# Patient Record
Sex: FEMALE | Race: WHITE | NOT HISPANIC OR LATINO | URBAN - METROPOLITAN AREA
[De-identification: names, ages, dates, MRNs, and addresses within clinical notes are randomized per-mention and may not be internally consistent; named-entity substitution may affect disease eponyms.]

---

## 2018-06-25 ENCOUNTER — EMERGENCY (EMERGENCY)
Facility: HOSPITAL | Age: 83
LOS: 0 days | Discharge: HOME | End: 2018-06-26
Attending: EMERGENCY MEDICINE | Admitting: EMERGENCY MEDICINE

## 2018-06-25 VITALS
HEART RATE: 79 BPM | RESPIRATION RATE: 18 BRPM | OXYGEN SATURATION: 98 % | SYSTOLIC BLOOD PRESSURE: 223 MMHG | DIASTOLIC BLOOD PRESSURE: 91 MMHG | TEMPERATURE: 97 F

## 2018-06-25 DIAGNOSIS — I10 ESSENTIAL (PRIMARY) HYPERTENSION: ICD-10-CM
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
## 2018-06-25 DIAGNOSIS — M25.521 PAIN IN RIGHT ELBOW: ICD-10-CM
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
## 2018-06-25 DIAGNOSIS — S52.021A DISPLACED FRACTURE OF OLECRANON PROCESS WITHOUT INTRAARTICULAR EXTENSION OF RIGHT ULNA, INITIAL ENCOUNTER FOR CLOSED FRACTURE: ICD-10-CM
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
## 2018-06-25 DIAGNOSIS — E11.9 TYPE 2 DIABETES MELLITUS WITHOUT COMPLICATIONS: ICD-10-CM

## 2018-06-25 DIAGNOSIS — M25.551 PAIN IN RIGHT HIP: ICD-10-CM
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
## 2018-06-25 NOTE — ED ADULT TRIAGE NOTE - CHIEF COMPLAINT QUOTE
pt slipped and fell from standing, c/o right arm and right hip pain. no LOC. not on anticoagulations.

## 2018-06-26 RX ORDER — ACETAMINOPHEN 500 MG
975 TABLET ORAL ONCE
Qty: 0 | Refills: 0 | Status: DISCONTINUED | OUTPATIENT
Start: 2018-06-26 | End: 2018-06-26

## 2018-06-26 NOTE — ED PROVIDER NOTE - PROGRESS NOTE DETAILS
Spoke to Dr. Reyes from orthopedics, advised to splint patient for right olecranon fracture and follow up with Dr. Crawford as outpatient. I received a call from radiologist who stated there may be an acute vs subacute left pubic body fracture; I called and left a message on patient's daughter's cell phone; patient was bearing weight on b/l LE; ambulating well in ED without assistance, not complaining of midline pelvic pain, patient was only complaining of right hip pain.

## 2018-06-26 NOTE — ED PROVIDER NOTE - MEDICAL DECISION MAKING DETAILS
I have personally performed a history and physical exam on this patient and personally directed the management of the patient. Patient presents for evaluation of right sided elbow pan that is moderate and throbbing in nature after she sustained a mechanical fall after slipping on a wet floor. She denies any loc and denies any vomiting.  The patient notes that she has a swollen painful right elbow with bruising noted she has decreased rom, radial pulses 2 +=, cap refill normal.  we obtained xrays which reveals olecranon fracture because of the injury we consulted ortho who reccomends outpatient management at this time splint applied I will discharge with follow up to dr abel in am.

## 2018-06-26 NOTE — ED PROVIDER NOTE - MUSCULOSKELETAL MINIMAL EXAM
right- forearm has swelling and bruising noted decreased rom, radial pulses 2 += right elbow hematoma with decreased ROM at elbow joint and tenderness to palpation, radial pulse 2+, sensation intact; no snuffbox tenderness, ROM intact at right wrist

## 2018-06-26 NOTE — ED PROVIDER NOTE - OBJECTIVE STATEMENT
85 y/o female with pmhx of HTN, DM presents s/p mechanical trip and fall. Patient states she was walking on a wet floor, slipped and fell on to her right side. Denies head trauma, LOC, blood thinners, headache, n/v. Admits to right elbow and hip pain.

## 2019-12-19 ENCOUNTER — INPATIENT (INPATIENT)
Facility: HOSPITAL | Age: 84
LOS: 1 days | Discharge: HOSPICE | End: 2019-12-21
Attending: INTERNAL MEDICINE | Admitting: INTERNAL MEDICINE
Payer: MEDICARE

## 2019-12-19 VITALS
RESPIRATION RATE: 16 BRPM | HEART RATE: 88 BPM | DIASTOLIC BLOOD PRESSURE: 60 MMHG | OXYGEN SATURATION: 100 % | SYSTOLIC BLOOD PRESSURE: 134 MMHG | TEMPERATURE: 98 F

## 2019-12-19 DIAGNOSIS — Z90.49 ACQUIRED ABSENCE OF OTHER SPECIFIED PARTS OF DIGESTIVE TRACT: Chronic | ICD-10-CM

## 2019-12-19 LAB
ALBUMIN SERPL ELPH-MCNC: 3.4 G/DL — LOW (ref 3.5–5.2)
ALP SERPL-CCNC: 62 U/L — SIGNIFICANT CHANGE UP (ref 30–115)
ALT FLD-CCNC: <5 U/L — SIGNIFICANT CHANGE UP (ref 0–41)
ANION GAP SERPL CALC-SCNC: 16 MMOL/L — HIGH (ref 7–14)
APPEARANCE UR: CLEAR — SIGNIFICANT CHANGE UP
AST SERPL-CCNC: 16 U/L — SIGNIFICANT CHANGE UP (ref 0–41)
BACTERIA # UR AUTO: NEGATIVE — SIGNIFICANT CHANGE UP
BASOPHILS # BLD AUTO: 0.03 K/UL — SIGNIFICANT CHANGE UP (ref 0–0.2)
BASOPHILS NFR BLD AUTO: 0.4 % — SIGNIFICANT CHANGE UP (ref 0–1)
BILIRUB SERPL-MCNC: 0.4 MG/DL — SIGNIFICANT CHANGE UP (ref 0.2–1.2)
BILIRUB UR-MCNC: NEGATIVE — SIGNIFICANT CHANGE UP
BUN SERPL-MCNC: 56 MG/DL — HIGH (ref 10–20)
CALCIUM SERPL-MCNC: 8.9 MG/DL — SIGNIFICANT CHANGE UP (ref 8.5–10.1)
CHLORIDE SERPL-SCNC: 104 MMOL/L — SIGNIFICANT CHANGE UP (ref 98–110)
CO2 SERPL-SCNC: 21 MMOL/L — SIGNIFICANT CHANGE UP (ref 17–32)
COLOR SPEC: YELLOW — SIGNIFICANT CHANGE UP
CREAT SERPL-MCNC: 3 MG/DL — HIGH (ref 0.7–1.5)
DIFF PNL FLD: NEGATIVE — SIGNIFICANT CHANGE UP
EOSINOPHIL # BLD AUTO: 0.08 K/UL — SIGNIFICANT CHANGE UP (ref 0–0.7)
EOSINOPHIL NFR BLD AUTO: 1.1 % — SIGNIFICANT CHANGE UP (ref 0–8)
EPI CELLS # UR: 3 /HPF — SIGNIFICANT CHANGE UP (ref 0–5)
GLUCOSE BLDC GLUCOMTR-MCNC: 92 MG/DL — SIGNIFICANT CHANGE UP (ref 70–99)
GLUCOSE SERPL-MCNC: 128 MG/DL — HIGH (ref 70–99)
GLUCOSE UR QL: NEGATIVE — SIGNIFICANT CHANGE UP
HCT VFR BLD CALC: 29.1 % — LOW (ref 37–47)
HGB BLD-MCNC: 9.2 G/DL — LOW (ref 12–16)
HYALINE CASTS # UR AUTO: 8 /LPF — HIGH (ref 0–7)
IMM GRANULOCYTES NFR BLD AUTO: 0.8 % — HIGH (ref 0.1–0.3)
KETONES UR-MCNC: NEGATIVE — SIGNIFICANT CHANGE UP
LACTATE SERPL-SCNC: 1.1 MMOL/L — SIGNIFICANT CHANGE UP (ref 0.7–2)
LEUKOCYTE ESTERASE UR-ACNC: ABNORMAL
LIDOCAIN IGE QN: 18 U/L — SIGNIFICANT CHANGE UP (ref 7–60)
LYMPHOCYTES # BLD AUTO: 0.98 K/UL — LOW (ref 1.2–3.4)
LYMPHOCYTES # BLD AUTO: 13.6 % — LOW (ref 20.5–51.1)
MCHC RBC-ENTMCNC: 29.4 PG — SIGNIFICANT CHANGE UP (ref 27–31)
MCHC RBC-ENTMCNC: 31.6 G/DL — LOW (ref 32–37)
MCV RBC AUTO: 93 FL — SIGNIFICANT CHANGE UP (ref 81–99)
MONOCYTES # BLD AUTO: 0.61 K/UL — HIGH (ref 0.1–0.6)
MONOCYTES NFR BLD AUTO: 8.5 % — SIGNIFICANT CHANGE UP (ref 1.7–9.3)
NEUTROPHILS # BLD AUTO: 5.42 K/UL — SIGNIFICANT CHANGE UP (ref 1.4–6.5)
NEUTROPHILS NFR BLD AUTO: 75.6 % — HIGH (ref 42.2–75.2)
NITRITE UR-MCNC: NEGATIVE — SIGNIFICANT CHANGE UP
NRBC # BLD: 0 /100 WBCS — SIGNIFICANT CHANGE UP (ref 0–0)
PH UR: 5.5 — SIGNIFICANT CHANGE UP (ref 5–8)
PLATELET # BLD AUTO: 337 K/UL — SIGNIFICANT CHANGE UP (ref 130–400)
POTASSIUM SERPL-MCNC: 4.8 MMOL/L — SIGNIFICANT CHANGE UP (ref 3.5–5)
POTASSIUM SERPL-SCNC: 4.8 MMOL/L — SIGNIFICANT CHANGE UP (ref 3.5–5)
PROT SERPL-MCNC: 7.3 G/DL — SIGNIFICANT CHANGE UP (ref 6–8)
PROT UR-MCNC: ABNORMAL
RBC # BLD: 3.13 M/UL — LOW (ref 4.2–5.4)
RBC # FLD: 12.1 % — SIGNIFICANT CHANGE UP (ref 11.5–14.5)
RBC CASTS # UR COMP ASSIST: 6 /HPF — HIGH (ref 0–4)
SODIUM SERPL-SCNC: 141 MMOL/L — SIGNIFICANT CHANGE UP (ref 135–146)
SP GR SPEC: 1.02 — SIGNIFICANT CHANGE UP (ref 1.01–1.02)
UROBILINOGEN FLD QL: SIGNIFICANT CHANGE UP
WBC # BLD: 7.18 K/UL — SIGNIFICANT CHANGE UP (ref 4.8–10.8)
WBC # FLD AUTO: 7.18 K/UL — SIGNIFICANT CHANGE UP (ref 4.8–10.8)
WBC UR QL: 32 /HPF — HIGH (ref 0–5)

## 2019-12-19 PROCEDURE — 71045 X-RAY EXAM CHEST 1 VIEW: CPT | Mod: 26

## 2019-12-19 PROCEDURE — 93010 ELECTROCARDIOGRAM REPORT: CPT

## 2019-12-19 PROCEDURE — 74176 CT ABD & PELVIS W/O CONTRAST: CPT | Mod: 26

## 2019-12-19 PROCEDURE — 99285 EMERGENCY DEPT VISIT HI MDM: CPT | Mod: GC

## 2019-12-19 RX ORDER — TRAMADOL HYDROCHLORIDE 50 MG/1
25 TABLET ORAL THREE TIMES A DAY
Refills: 0 | Status: DISCONTINUED | OUTPATIENT
Start: 2019-12-19 | End: 2019-12-20

## 2019-12-19 RX ORDER — PANTOPRAZOLE SODIUM 20 MG/1
40 TABLET, DELAYED RELEASE ORAL
Refills: 0 | Status: DISCONTINUED | OUTPATIENT
Start: 2019-12-19 | End: 2019-12-21

## 2019-12-19 RX ORDER — SODIUM CHLORIDE 9 MG/ML
1000 INJECTION INTRAMUSCULAR; INTRAVENOUS; SUBCUTANEOUS ONCE
Refills: 0 | Status: COMPLETED | OUTPATIENT
Start: 2019-12-19 | End: 2019-12-19

## 2019-12-19 RX ORDER — ACETAMINOPHEN 500 MG
1 TABLET ORAL
Qty: 0 | Refills: 0 | DISCHARGE

## 2019-12-19 RX ORDER — ONDANSETRON 8 MG/1
4 TABLET, FILM COATED ORAL ONCE
Refills: 0 | Status: COMPLETED | OUTPATIENT
Start: 2019-12-19 | End: 2019-12-19

## 2019-12-19 RX ORDER — GABAPENTIN 400 MG/1
100 CAPSULE ORAL THREE TIMES A DAY
Refills: 0 | Status: DISCONTINUED | OUTPATIENT
Start: 2019-12-19 | End: 2019-12-21

## 2019-12-19 RX ORDER — MORPHINE SULFATE 50 MG/1
4 CAPSULE, EXTENDED RELEASE ORAL ONCE
Refills: 0 | Status: DISCONTINUED | OUTPATIENT
Start: 2019-12-19 | End: 2019-12-19

## 2019-12-19 RX ORDER — HEPARIN SODIUM 5000 [USP'U]/ML
5000 INJECTION INTRAVENOUS; SUBCUTANEOUS EVERY 8 HOURS
Refills: 0 | Status: DISCONTINUED | OUTPATIENT
Start: 2019-12-19 | End: 2019-12-21

## 2019-12-19 RX ORDER — SODIUM CHLORIDE 9 MG/ML
1000 INJECTION, SOLUTION INTRAVENOUS
Refills: 0 | Status: DISCONTINUED | OUTPATIENT
Start: 2019-12-19 | End: 2019-12-20

## 2019-12-19 RX ORDER — GABAPENTIN 400 MG/1
1 CAPSULE ORAL
Qty: 0 | Refills: 0 | DISCHARGE

## 2019-12-19 RX ORDER — CARVEDILOL PHOSPHATE 80 MG/1
1 CAPSULE, EXTENDED RELEASE ORAL
Qty: 0 | Refills: 0 | DISCHARGE

## 2019-12-19 RX ORDER — SITAGLIPTIN AND METFORMIN HYDROCHLORIDE 500; 50 MG/1; MG/1
1 TABLET, FILM COATED ORAL
Qty: 0 | Refills: 0 | DISCHARGE

## 2019-12-19 RX ORDER — MAGNESIUM OXIDE 400 MG ORAL TABLET 241.3 MG
1 TABLET ORAL
Qty: 0 | Refills: 0 | DISCHARGE

## 2019-12-19 RX ADMIN — ONDANSETRON 4 MILLIGRAM(S): 8 TABLET, FILM COATED ORAL at 16:55

## 2019-12-19 RX ADMIN — MORPHINE SULFATE 4 MILLIGRAM(S): 50 CAPSULE, EXTENDED RELEASE ORAL at 16:55

## 2019-12-19 RX ADMIN — MORPHINE SULFATE 4 MILLIGRAM(S): 50 CAPSULE, EXTENDED RELEASE ORAL at 18:27

## 2019-12-19 RX ADMIN — SODIUM CHLORIDE 1000 MILLILITER(S): 9 INJECTION INTRAMUSCULAR; INTRAVENOUS; SUBCUTANEOUS at 18:27

## 2019-12-19 RX ADMIN — SODIUM CHLORIDE 1000 MILLILITER(S): 9 INJECTION INTRAMUSCULAR; INTRAVENOUS; SUBCUTANEOUS at 16:55

## 2019-12-19 NOTE — H&P ADULT - ATTENDING COMMENTS
Patient seen and examined independently. Agree with resident note/ history / physical exam and plan of care with following exceptions/additions/updates. Case discussed with house-staff, nursing and patient/pt decision maker.     translation by the resident who speaks [place]. Dr Meyers    pt does not want any procedures, including paracentesis at this point. agrees with bedside ultrasound as long as there is no intervention involved ( including an needle aspiration )     #Progress Note Handoff  Pending (specify):  oncology consult. abdominal ultrasound. paracentesis if pt agrees.   Family discussion: dw pt and her daughter and her grandson at the bedside. pt refusing paracentesis and all other procedures, family is not ready for goals of care but pt only wants comfort measures. at this time she is full code.   Disposition: Home

## 2019-12-19 NOTE — H&P ADULT - NSICDXPASTMEDICALHX_GEN_ALL_CORE_FT
PAST MEDICAL HISTORY:  CAD (coronary artery disease)     CKD (chronic kidney disease)     DM (diabetes mellitus)     Dyslipidemia     GERD (gastroesophageal reflux disease)     HTN (hypertension)     Spinal stenosis

## 2019-12-19 NOTE — ED PROVIDER NOTE - PHYSICAL EXAMINATION
PHYSICAL EXAM:  GENERAL: NAD, lying in bed comfortably  HEAD:  Atraumatic, Normocephalic  EYES: EOMI, PERRLA, conjunctiva and sclera clear  ENT: Moist mucous membranes  NECK: Supple, No JVD  CHEST/LUNG: Clear to auscultation bilaterally; No rales, rhonchi, wheezing, or rubs. Unlabored respirations  HEART: Regular rate and rhythm; No murmurs, rubs, or gallops  ABDOMEN: Bowel sounds present; Distended, diffusely tender; no fluid wave appreciated.   EXTREMITIES: No clubbing, cyanosis, or edema  NERVOUS SYSTEM:  Alert & Oriented X3, speech clear. No deficits; Decreased vision and hearing at baseline.   SKIN: No rashes or lesions

## 2019-12-19 NOTE — H&P ADULT - HISTORY OF PRESENT ILLNESS
86yo F w PMH HLD, DM, HTN, Chronic ischemic heart disease, CKD, osteoarthritis, GERD, spinal stenosis presenting to the ED with abdominal pain for past 4 weeks. Patient is accompanied by daughter who is serving as . Has had diffuse abdominal pain for about 3 weeks, accompanied by decreased oral intake, decreased bowel movements, decreased urine output. Denies nausea or vomiting but does endorse worsened abdominal pain with eating. Patient went to PMD this AM and was instructed to come to the ED for imaging and further work up. Denies fever, chills, CP, SOB, palpitations. She is passing flatus.      84 y/o F with PMH of CKD, high blood pressure, DM, CAD, CHF, and appendectomy sent from PMD for imaging c/o abdominal pain x 3 weeks. Pt is accompanies by daughter who is translating and states pt’s pain is diffuse, worse with food, and associated with nausea, decreased PO intake, weight loss, and abdominal distention. Pt has had (+) flatus and small bowel movements over this time. Pt  additionally notes decreased urine output but reports she recently stopped her Lasix.   Constitutional: Well appearing. No acute distress. Non toxic. Eyes: PERRLA. Extraocular movements intact, no entrapment. Conjunctiva normal. ENT: No nasal discharge. Dry mucus membranes. Neck: Supple, non tender, full range of motion. CV: RRR no murmurs, rubs, or gallops. +S1S2. Pulm: Clear to auscultation bilaterally. Normal work of breathing. Abd: (+)diffusely tender and (+) distention, no rebound/guarding +BS. Ext: Warm and well perfused x4, moving all extremities, no edema. Psy: Cooperative, appropriate. Skin: Warm, dry, no rash. Neuro: CN2-12 grossly intact no sensory or motor deficits throughout, no drift.     Date: 19-Dec-2019 18:08.     Progress: bedside ultrasound revealed ascites. pt reexamined after morphine/zofran - she states she feels the same, as she hasn't eaten/drank anything any therefore pain not exacerbated - is comfortable at rest. pending Ct scan. Patient speaks Monegasque and poor historian and is legally blind and very hard of hearing; Her daughter did the translation as per the request from patient.    86 y/o F with PMH of CKD, HTN, DM, CAD, CHF, osteoarthritis, GERD, spinal stenosis sent from PMD abdominal pain for past 4 weeks. Gradual onset, dull aching pain, no aggravating or relieving factors; associated with  decreased oral intake and weight loss, nausea, abdominal distension for 1 month duration  She also reports decreased urine output for last 1 week.  Patient has normal bowel habit but reports decreased amount of BM which she attributes to decreased oral intake. She passes flatus+     She lives alone and was independent with her daily activities until a month back with help from family; she used to go to the  5 days a week.  She has been feeling discomfort for longer than a month but since she is always complaining; family thought she will get better with time but instead she kept on getting worse and abdomen started to distend; Then she was brought to see her PMD Dr Rey Carpenter in [place] who referred to the ED.    In the ED,   vitals stable  Bedside ultrasound ascitis  CT abdomen ascitis, with ?metastatic ovarian cancer but no bowel obstruction or transition point.

## 2019-12-19 NOTE — ED ADULT NURSE NOTE - PMH
DM (diabetes mellitus)    HTN (hypertension)    No pertinent past medical history CKD (chronic kidney disease)    DM (diabetes mellitus)    Dyslipidemia    GERD (gastroesophageal reflux disease)    HTN (hypertension)    No pertinent past medical history    Spinal stenosis

## 2019-12-19 NOTE — ED PROVIDER NOTE - NS ED ROS FT
REVIEW OF SYSTEMS:    CONSTITUTIONAL: No weakness, fevers or chills  EYES/ENT: No visual changes;  No vertigo or throat pain   NECK: No pain or stiffness  RESPIRATORY: No cough, wheezing, hemoptysis; No shortness of breath  CARDIOVASCULAR: No chest pain or palpitations  GASTROINTESTINAL: Diffuse abdominal pain with distention; decreased appetite; no nausea, vomiting, or hematemesis; No diarrhea or constipation. No melena or hematochezia. Decreased bowel movement frequency.   GENITOURINARY: No dysuria, frequency or hematuria. Decreased output.   NEUROLOGICAL: No numbness or weakness  SKIN: No itching, rashes

## 2019-12-19 NOTE — ED PROVIDER NOTE - OBJECTIVE STATEMENT
84yo F w PMH HLD, DM, HTN, Chronic ischemic heart disease, CKD, osteoarthritis, GERD, spinal stenosis 86yo F w PMH HLD, DM, HTN, Chronic ischemic heart disease, CKD, osteoarthritis, GERD, spinal stenosis presenting to the ED with abdominal pain for past 4 weeks. Patient is accompanied by daughter who is serving as . Has had diffuse abdominal pain for about 3 weeks, accompanied by decreased oral intake, decreased bowel movements, decreased urine output. Denies nausea or vomiting but does endorse worsened abdominal pain with eating. Patient went to PMD this AM and was instructed to come to the ED for imaging and further work up. Denies fever, chills, CP, SOB, palpitations. She is passing flatus.

## 2019-12-19 NOTE — ED PROVIDER NOTE - PROGRESS NOTE DETAILS
ATTENDING NOTE: I personally evaluated the patient. I reviewed the Resident’s note (as assigned above), and agree with the findings and plan except as documented in my note.   84 y/o F with PMH of CKD, high blood pressure, DM, CAD, CHF, and appendectomy sent from PMD for imaging c/o abdominal pain x 3 weeks. Pt is accompanies by daughter who is translating and states pt’s pain is diffuse, worse with food, and associated with nausea, decreased PO intake, weight loss, and abdominal distention. Pt has had (+) flatus and small bowel movements over this time. Pt  additionally notes decreased urine output but reports she recently stopped her Lasix.   Constitutional: Well appearing. No acute distress. Non toxic. Eyes: PERRLA. Extraocular movements intact, no entrapment. Conjunctiva normal. ENT: No nasal discharge. Dry mucus membranes. Neck: Supple, non tender, full range of motion. CV: RRR no murmurs, rubs, or gallops. +S1S2. Pulm: Clear to auscultation bilaterally. Normal work of breathing. Abd: (+)diffusely tender and (+) distention, no rebound/guarding +BS. Ext: Warm and well perfused x4, moving all extremities, no edema. Psy: Cooperative, appropriate. Skin: Warm, dry, no rash. Neuro: CN2-12 grossly intact no sensory or motor deficits throughout, no drift. bedside ultrasound revealed ascites. pt reexamined after morphine/zofran - she states she feels the same, as she hasn't eaten/drank anything any therefore pain not exacerbated - is comfortable at rest. pending Ct scan. GYN paged

## 2019-12-19 NOTE — ED ADULT NURSE NOTE - OBJECTIVE STATEMENT
patient states has abdomen pain sharp today, states went urination with no issues and had bowel movement today patient states has abdomen pain sharp today, states went urination with no issues and had bowel movement today. sent from PMD for imaging c/o abdominal pain x 3 weeks. Pt is accompanies by daughter who is translating and states pt’s pain is diffuse, worse with food, and associated with nausea, decreased PO intake, weight loss, and abdominal distention. Pt has had (+) flatus and small bowel movements over this time. Pt  additionally notes decreased urine output but reports she recently stopped her Lasix.

## 2019-12-19 NOTE — ED PROVIDER NOTE - CLINICAL SUMMARY MEDICAL DECISION MAKING FREE TEXT BOX
84 y/o F with PMH of CKD, high blood pressure, DM, CAD, CHF, and appendectomy sent from PMD for imaging c/o abdominal pain x 3 weeks. Pt is accompanies by daughter who is translating and states pt’s pain is diffuse, worse with food, and associated with nausea, decreased PO intake, weight loss, and abdominal distention. Pt has had (+) flatus and small bowel movements over this time. Pt  additionally notes decreased urine output but reports she recently stopped her Lasix.   Constitutional: Well appearing. No acute distress. Non toxic. Eyes: PERRLA. Extraocular movements intact, no entrapment. Conjunctiva normal. ENT: No nasal discharge. Dry mucus membranes. Neck: Supple, non tender, full range of motion. CV: RRR no murmurs, rubs, or gallops. +S1S2. Pulm: Clear to auscultation bilaterally. Normal work of breathing. Abd: (+)diffusely tender and (+) distention, no rebound/guarding +BS. Ext: Warm and well perfused x4, moving all extremities, no edema. Psy: Cooperative, appropriate. Skin: Warm, dry, no rash. Neuro: CN2-12 grossly intact no sensory or motor deficits throughout, no drift. labs ekg imaging reviewed. will admit 86 y/o F with PMH of CKD, high blood pressure, DM, CAD, CHF, and appendectomy sent from PMD for imaging c/o abdominal pain x 3 weeks. Pt is accompanies by daughter who is translating and states pt’s pain is diffuse, worse with food, and associated with nausea, decreased PO intake, weight loss, and abdominal distention. Pt has had (+) flatus and small bowel movements over this time. Pt  additionally notes decreased urine output but reports she recently stopped her Lasix. labs ekg imaging reviewed. gyn paged. dw MAR, will follow-up GYN. will admit

## 2019-12-19 NOTE — H&P ADULT - ASSESSMENT
Metastatic ovarian cancer with Ascitis:  Bowel sounds present, passing gas  will keep NPO  surgery consult  pain control  iv fluids Metastatic ovarian cancer with Ascitis:  Bowel sounds present, passing gas  no need for surgery consult  will get Gyn consult  will need hem/onc consult  dietary consult  zofran for nausea Metastatic ovarian cancer with Ascitis:  Bowel sounds present, passing gas  no need for surgery consult  will get Gyn consult  will need hem/onc consult  dietary consult  zofran for nausea    VICENTE on CKD 3  hold losartan  gentle hydration    DM:  insulin    DLD:  statin    GERD:  PPI    Spinal stenosis:  tramadol as needed    DVT ppx:  heparin    Dispo: acute Metastatic ovarian cancer with omental involvement with ascitis  Bowel sounds present, passing gas  no need for surgery consult  will get Gyn consult  will need hem/onc consult  dietary consult  zofran for nausea    B/L pleural effusion:  metastatic Vs reactive  no crackles or wheeze  No SOB  get BNP    UA positive:  denies urinary frequency  get urine culture  off abx for now    VICENTE on CKD 3  hold losartan  gentle hydration    DM:  Patient is allergic to insulin  monitor finger stick  cannot give metformin VICENTE  may need endocrine consult if persistent elevation in insulin    DLD:  statin    GERD:  PPI    Spinal stenosis:  tramadol as needed    DVT ppx:  heparin    Dispo: acute Metastatic ovarian cancer with omental involvement with ascitis  Bowel sounds present, passing gas/no bowel obstruction/no jaundice  no need for surgery consult  will get Gyn consult  will need hem/onc consult  dietary consult  zofran for nausea    B/L pleural effusion:  metastatic Vs reactive  no crackles or wheeze  No SOB  get BNP    UA positive:  denies urinary frequency  get urine culture  off abx for now    VICENTE on CKD 3  hold losartan  gentle hydration  urien studies    Anemia:  normocytic  iron studies  ?Anemia of chronic disease    DM:  Patient is allergic to insulin  monitor finger stick  cannot give metformin VICENTE  may need endocrine consult if persistent elevation in insulin    DLD:  statin    GERD:  PPI    Spinal stenosis:  tramadol as needed    DVT ppx:  heparin    Dispo: acute

## 2019-12-19 NOTE — H&P ADULT - NSHPPHYSICALEXAM_GEN_ALL_CORE
PHYSICAL EXAM:  GENERAL: NAD, well-developed  HEAD:  Atraumatic, Normocephalic  EYES: EOMI, PERRLA, conjunctiva and sclera clear  NECK: Supple, No JVD  CHEST/LUNG: Clear to auscultation bilaterally; No wheeze  HEART: Regular rate and rhythm; No murmurs, rubs, or gallops  ABDOMEN: distended++, tenderness+, BS+  EXTREMITIES:  2+ Peripheral Pulses, No clubbing, cyanosis, or edema  PSYCH: AAOx3  NEUROLOGY: non-focal  SKIN: No rashes or lesions PHYSICAL EXAM:  GENERAL: NAD, well-developed  HEAD:  Atraumatic, Normocephalic  EYES: legally blind;   NECK: Supple, No JVD  CHEST/LUNG: decreased breath sounds at bases  HEART: Regular rate and rhythm; No murmurs, rubs, or gallops  ABDOMEN: distended++, non tender, BS+  EXTREMITIES:  no edema  PSYCH: AAOx3  NEUROLOGY: non-focal  SKIN: No rashes or lesions PHYSICAL EXAM:  GENERAL: NAD, well-developed  HEAD:  Atraumatic, Normocephalic  EYES: legally blind;   NECK: Supple, No JVD  CHEST/LUNG: decreased breath sounds at bases  HEART: Regular rate and rhythm; No murmurs, rubs, or gallops  ABDOMEN: distended++, non tender, BS+  EXTREMITIES:  no edema  NEUROLOGY: non-focal

## 2019-12-19 NOTE — H&P ADULT - NSHPLABSRESULTS_GEN_ALL_CORE
9.2    7.18  )-----------( 337      ( 19 Dec 2019 16:58 )             29.1                 141  |  104  |  56<H>  ----------------------------<  128<H>  4.8   |  21  |  3.0<H>    Ca    8.9      19 Dec 2019 16:58    TPro  7.3  /  Alb  3.4<L>  /  TBili  0.4  /  DBili  x   /  AST  16  /  ALT  <5  /  AlkPhos  62      LIVER FUNCTIONS - ( 19 Dec 2019 16:58 )  Alb: 3.4 g/dL / Pro: 7.3 g/dL / ALK PHOS: 62 U/L / ALT: <5 U/L / AST: 16 U/L / GGT: x                   Urinalysis Basic - ( 19 Dec 2019 16:42 )    Color: Yellow / Appearance: Clear / S.019 / pH: x  Gluc: x / Ketone: Negative  / Bili: Negative / Urobili: <2 mg/dL   Blood: x / Protein: 30 mg/dL / Nitrite: Negative   Leuk Esterase: Moderate / RBC: 6 /HPF / WBC 32 /HPF   Sq Epi: x / Non Sq Epi: 3 /HPF / Bacteria: Negative      < from: CT Abdomen and Pelvis No Cont (19 @ 19:23) >    3.5 cm, thick-walled left adnexal cystic mass with large volume of ascites, omental caking, and lymphadenopathy. The findings are most consistent with GYN malignancy (especially ovarian carcinoma) with metastatic disease within the abdomen.      The findings were discussed with Dr. Francisca Meneses at 8:00 PM 2019, with read back.    < end of copied text > 9.2    7.18  )-----------( 337      ( 19 Dec 2019 16:58 )             29.1                 141  |  104  |  56<H>  ----------------------------<  128<H>  4.8   |  21  |  3.0<H>    Ca    8.9      19 Dec 2019 16:58    TPro  7.3  /  Alb  3.4<L>  /  TBili  0.4  /  DBili  x   /  AST  16  /  ALT  <5  /  AlkPhos  62      LIVER FUNCTIONS - ( 19 Dec 2019 16:58 )  Alb: 3.4 g/dL / Pro: 7.3 g/dL / ALK PHOS: 62 U/L / ALT: <5 U/L / AST: 16 U/L / GGT: x                   Urinalysis Basic - ( 19 Dec 2019 16:42 )    Color: Yellow / Appearance: Clear / S.019 / pH: x  Gluc: x / Ketone: Negative  / Bili: Negative / Urobili: <2 mg/dL   Blood: x / Protein: 30 mg/dL / Nitrite: Negative   Leuk Esterase: Moderate / RBC: 6 /HPF / WBC 32 /HPF   Sq Epi: x / Non Sq Epi: 3 /HPF / Bacteria: Negative      < from: CT Abdomen and Pelvis No Cont (19 @ 19:23) >    < from: CT Abdomen and Pelvis No Cont (19 @ 19:23) >    3.5 cm, thick-walled left adnexal cystic mass with large volume of ascites, omental caking, and lymphadenopathy. The findings are most consistent with GYN malignancy (especially ovarian carcinoma) with metastatic disease within the abdomen.      The findings were discussed with Dr. Francisca Meneses at 8:00 PM 2019, with read back.    < end of copied text >        3.5 cm, thick-walled left adnexal cystic mass with large volume of ascites, omental caking, and lymphadenopathy. The findings are most consistent with GYN malignancy (especially ovarian carcinoma) with metastatic disease within the abdomen.      The findings were discussed with Dr. Francisca Meneses at 8:00 PM 2019, with read back.    < end of copied text >

## 2019-12-20 LAB
ALBUMIN SERPL ELPH-MCNC: 3 G/DL — LOW (ref 3.5–5.2)
ALP SERPL-CCNC: 54 U/L — SIGNIFICANT CHANGE UP (ref 30–115)
ALT FLD-CCNC: <5 U/L — SIGNIFICANT CHANGE UP (ref 0–41)
ANION GAP SERPL CALC-SCNC: 16 MMOL/L — HIGH (ref 7–14)
AST SERPL-CCNC: 15 U/L — SIGNIFICANT CHANGE UP (ref 0–41)
BASOPHILS # BLD AUTO: 0.04 K/UL — SIGNIFICANT CHANGE UP (ref 0–0.2)
BASOPHILS NFR BLD AUTO: 0.7 % — SIGNIFICANT CHANGE UP (ref 0–1)
BILIRUB DIRECT SERPL-MCNC: <0.2 MG/DL — SIGNIFICANT CHANGE UP (ref 0–0.2)
BILIRUB INDIRECT FLD-MCNC: >0.2 MG/DL — SIGNIFICANT CHANGE UP (ref 0.2–1.2)
BILIRUB SERPL-MCNC: 0.4 MG/DL — SIGNIFICANT CHANGE UP (ref 0.2–1.2)
BUN SERPL-MCNC: 52 MG/DL — HIGH (ref 10–20)
CALCIUM SERPL-MCNC: 8.4 MG/DL — LOW (ref 8.5–10.1)
CHLORIDE SERPL-SCNC: 106 MMOL/L — SIGNIFICANT CHANGE UP (ref 98–110)
CO2 SERPL-SCNC: 21 MMOL/L — SIGNIFICANT CHANGE UP (ref 17–32)
CREAT SERPL-MCNC: 2.8 MG/DL — HIGH (ref 0.7–1.5)
CULTURE RESULTS: SIGNIFICANT CHANGE UP
EOSINOPHIL # BLD AUTO: 0.12 K/UL — SIGNIFICANT CHANGE UP (ref 0–0.7)
EOSINOPHIL NFR BLD AUTO: 2.2 % — SIGNIFICANT CHANGE UP (ref 0–8)
FERRITIN SERPL-MCNC: 205 NG/ML — HIGH (ref 15–150)
GLUCOSE BLDC GLUCOMTR-MCNC: 100 MG/DL — HIGH (ref 70–99)
GLUCOSE BLDC GLUCOMTR-MCNC: 131 MG/DL — HIGH (ref 70–99)
GLUCOSE BLDC GLUCOMTR-MCNC: 135 MG/DL — HIGH (ref 70–99)
GLUCOSE BLDC GLUCOMTR-MCNC: 140 MG/DL — HIGH (ref 70–99)
GLUCOSE SERPL-MCNC: 151 MG/DL — HIGH (ref 70–99)
HCT VFR BLD CALC: 27.3 % — LOW (ref 37–47)
HGB BLD-MCNC: 8.6 G/DL — LOW (ref 12–16)
IMM GRANULOCYTES NFR BLD AUTO: 0.7 % — HIGH (ref 0.1–0.3)
IRON SATN MFR SERPL: 13 % — LOW (ref 15–50)
IRON SATN MFR SERPL: 24 UG/DL — LOW (ref 35–150)
LYMPHOCYTES # BLD AUTO: 1.14 K/UL — LOW (ref 1.2–3.4)
LYMPHOCYTES # BLD AUTO: 21 % — SIGNIFICANT CHANGE UP (ref 20.5–51.1)
MAGNESIUM SERPL-MCNC: 1.3 MG/DL — LOW (ref 1.8–2.4)
MCHC RBC-ENTMCNC: 29.5 PG — SIGNIFICANT CHANGE UP (ref 27–31)
MCHC RBC-ENTMCNC: 31.5 G/DL — LOW (ref 32–37)
MCV RBC AUTO: 93.5 FL — SIGNIFICANT CHANGE UP (ref 81–99)
MONOCYTES # BLD AUTO: 0.55 K/UL — SIGNIFICANT CHANGE UP (ref 0.1–0.6)
MONOCYTES NFR BLD AUTO: 10.1 % — HIGH (ref 1.7–9.3)
NEUTROPHILS # BLD AUTO: 3.55 K/UL — SIGNIFICANT CHANGE UP (ref 1.4–6.5)
NEUTROPHILS NFR BLD AUTO: 65.3 % — SIGNIFICANT CHANGE UP (ref 42.2–75.2)
NRBC # BLD: 0 /100 WBCS — SIGNIFICANT CHANGE UP (ref 0–0)
NT-PROBNP SERPL-SCNC: 925 PG/ML — HIGH (ref 0–300)
PLATELET # BLD AUTO: 305 K/UL — SIGNIFICANT CHANGE UP (ref 130–400)
POTASSIUM SERPL-MCNC: 5.2 MMOL/L — HIGH (ref 3.5–5)
POTASSIUM SERPL-SCNC: 5.2 MMOL/L — HIGH (ref 3.5–5)
PROT SERPL-MCNC: 6.1 G/DL — SIGNIFICANT CHANGE UP (ref 6–8)
RBC # BLD: 2.92 M/UL — LOW (ref 4.2–5.4)
RBC # FLD: 12 % — SIGNIFICANT CHANGE UP (ref 11.5–14.5)
SODIUM SERPL-SCNC: 143 MMOL/L — SIGNIFICANT CHANGE UP (ref 135–146)
SPECIMEN SOURCE: SIGNIFICANT CHANGE UP
TIBC SERPL-MCNC: 189 UG/DL — LOW (ref 220–430)
UIBC SERPL-MCNC: 165 UG/DL — SIGNIFICANT CHANGE UP (ref 110–370)
WBC # BLD: 5.44 K/UL — SIGNIFICANT CHANGE UP (ref 4.8–10.8)
WBC # FLD AUTO: 5.44 K/UL — SIGNIFICANT CHANGE UP (ref 4.8–10.8)

## 2019-12-20 PROCEDURE — 99221 1ST HOSP IP/OBS SF/LOW 40: CPT

## 2019-12-20 PROCEDURE — 99497 ADVNCD CARE PLAN 30 MIN: CPT | Mod: 25

## 2019-12-20 PROCEDURE — 88189 FLOWCYTOMETRY/READ 16 & >: CPT

## 2019-12-20 PROCEDURE — 99223 1ST HOSP IP/OBS HIGH 75: CPT

## 2019-12-20 PROCEDURE — 99222 1ST HOSP IP/OBS MODERATE 55: CPT

## 2019-12-20 PROCEDURE — 93970 EXTREMITY STUDY: CPT | Mod: 26

## 2019-12-20 RX ORDER — SODIUM CHLORIDE 9 MG/ML
1000 INJECTION, SOLUTION INTRAVENOUS
Refills: 0 | Status: DISCONTINUED | OUTPATIENT
Start: 2019-12-20 | End: 2019-12-20

## 2019-12-20 RX ORDER — TRAMADOL HYDROCHLORIDE 50 MG/1
25 TABLET ORAL
Refills: 0 | Status: DISCONTINUED | OUTPATIENT
Start: 2019-12-20 | End: 2019-12-21

## 2019-12-20 RX ORDER — LANOLIN ALCOHOL/MO/W.PET/CERES
5 CREAM (GRAM) TOPICAL ONCE
Refills: 0 | Status: COMPLETED | OUTPATIENT
Start: 2019-12-20 | End: 2019-12-20

## 2019-12-20 RX ORDER — MAGNESIUM SULFATE 500 MG/ML
2 VIAL (ML) INJECTION ONCE
Refills: 0 | Status: COMPLETED | OUTPATIENT
Start: 2019-12-20 | End: 2019-12-20

## 2019-12-20 RX ORDER — SODIUM POLYSTYRENE SULFONATE 4.1 MEQ/G
15 POWDER, FOR SUSPENSION ORAL ONCE
Refills: 0 | Status: COMPLETED | OUTPATIENT
Start: 2019-12-20 | End: 2019-12-20

## 2019-12-20 RX ADMIN — TRAMADOL HYDROCHLORIDE 25 MILLIGRAM(S): 50 TABLET ORAL at 11:05

## 2019-12-20 RX ADMIN — SODIUM POLYSTYRENE SULFONATE 15 GRAM(S): 4.1 POWDER, FOR SUSPENSION ORAL at 11:06

## 2019-12-20 RX ADMIN — SODIUM CHLORIDE 75 MILLILITER(S): 9 INJECTION, SOLUTION INTRAVENOUS at 02:10

## 2019-12-20 RX ADMIN — Medication 50 GRAM(S): at 11:06

## 2019-12-20 RX ADMIN — TRAMADOL HYDROCHLORIDE 25 MILLIGRAM(S): 50 TABLET ORAL at 10:32

## 2019-12-20 NOTE — CHART NOTE - NSCHARTNOTEFT_GEN_A_CORE
Spoke with family at length this morning. The patient was of sound mind and refusing any procedure involving a needle, with concern for complications and worsening of her clinical situation. The patient did, however agree to an ultrasound to "look" as long as no needle was used. Subsequently, after lengthy discussion, with palliative care on the case, the patient has agreed to a therapeutic and diagnosis paracentesis in the afternoon, as long as her "liver or heart are not affected". A hospice and podiatry consult has been placed at the request of the patients family as well. Will continue to follow.

## 2019-12-20 NOTE — PROGRESS NOTE ADULT - ASSESSMENT
84 y/o F with PMH of CKD, HTN, DM, CAD, CHF, osteoarthritis, GERD, spinal stenosis sent from PMD abdominal pain for past 4 weeks.     #Metastatic ovarian cancer with omental involvement with ascitis  - Bowel sounds present, passing gas/no bowel obstruction/no jaundice  - no need for surgery consult at this time  - F/U Gyn consult  - F/U Heme/Onc Consult?  - F/U Dietary consult  - F/U Palliative care  - Zofran for nausea    #UA positive:  - denies urinary frequency  - F/U Urine Culture  - off abx for now    #VICENTE on CKD 3  - hold losartan  - gentle hydration  - Follow BMP, no baseline?    #Anemia:  - normocytic  - F/U Fe Studies   - ?Anemia of chronic disease    #DM:  - Patient is allergic to insulin  - Monitor finger stick  - Cannot give metformin d/t VICENTE  - May need endocrine consult if persistent elevation in insulin    #DLD:  - statin    #GERD:  - PPI    #Spinal stenosis:  - tramadol as needed    #DVT ppx:  - heparin    Dispo: acute 84 y/o F with PMH of CKD, HTN, DM, CAD, CHF, osteoarthritis, GERD, spinal stenosis sent from PMD abdominal pain for past 4 weeks.     #Metastatic ovarian cancer with omental involvement with ascitis  - Bowel sounds present, passing gas/no bowel obstruction/no jaundice  - no need for surgery consult at this time  - F/U Gyn consult  - F/U Heme/Onc Consult?  - F/U Dietary consult  - F/U Palliative care  - Zofran for nausea  - Will talk to family today about the possibility of a paracentesis    #UA positive:  - denies urinary frequency  - F/U Urine Culture  - off abx for now    #VICENTE on CKD 3  - hold losartan  - gentle hydration  - Follow BMP, no baseline?    #Anemia:  - normocytic  - F/U Fe Studies   - ?Anemia of chronic disease    #DM:  - Patient is allergic to insulin  - Monitor finger stick  - Cannot give metformin d/t VICENTE  - May need endocrine consult if persistent elevation in insulin    #DLD:  - statin    #GERD:  - PPI    #Spinal stenosis:  - tramadol as needed    #DVT ppx:  - heparin    Dispo: acute

## 2019-12-20 NOTE — CONSULT NOTE ADULT - SUBJECTIVE AND OBJECTIVE BOX
Patient is a 85y old  Female who presents with a chief complaint of Abdominal pain (20 Dec 2019 08:10)      HPI:  Patient speaks Palestinian and poor historian and is legally blind and very hard of hearing; Her daughter did the translation as per the request from patient.    86 y/o F with PMH of CKD, HTN, DM, CAD, CHF, osteoarthritis, GERD, spinal stenosis sent from PMD abdominal pain for past 4 weeks. Gradual onset, dull aching pain, no aggravating or relieving factors; associated with  decreased oral intake and weight loss, nausea, abdominal distension for 1 month duration  She also reports decreased urine output for last 1 week.  Patient has normal bowel habit but reports decreased amount of BM which she attributes to decreased oral intake. She passes flatus+     She lives alone and was independent with her daily activities until a month back with help from family; she used to go to the  5 days a week.  She has been feeling discomfort for longer than a month but since she is always complaining; family thought she will get better with time but instead she kept on getting worse and abdomen started to distend; Then she was brought to see her PMD Dr Rey Carpenter in [place] who referred to the ED.    In the ED,   vitals stable  Bedside ultrasound ascitis  CT abdomen ascitis, with ?metastatic ovarian cancer but no bowel obstruction or transition point. (19 Dec 2019 20:29)         PAST MEDICAL & SURGICAL HISTORY:  CAD (coronary artery disease)  Spinal stenosis  CKD (chronic kidney disease)  GERD (gastroesophageal reflux disease)  Dyslipidemia  HTN (hypertension)  DM (diabetes mellitus)  S/P appendectomy      SOCIAL HISTORY:    FAMILY HISTORY:    Allergies    contrast media (iodine-based) (Anaphylaxis)  insulin (Unknown)  procaine (Unknown)    Intolerances      ROS:  Negative except for:        Height (cm): 157.48 (19 @ 16:15)  Weight (kg): 72.6 (19 @ 16:15)  BMI (kg/m2): 29.3 (19 @ 16:15)  BSA (m2): 1.74 (19 @ 16:15)      HOME MEDICATIONS:  carvedilol 25 mg oral tablet: 1 tab(s) orally 2 times a day (19 Dec 2019 21:32)  gabapentin 100 mg oral capsule: 1 cap(s) orally 3 times a day (19 Dec 2019 21:32)  glipiZIDE 10 mg oral tablet: 1 tab(s) orally once a day (19 Dec 2019 21:32)  Hyzaar 100 mg-25 mg oral tablet: 1 tab(s) orally once a day (19 Dec 2019 21:32)  Janumet 50 mg-1000 mg oral tablet: 1 tab(s) orally 2 times a day (19 Dec 2019 21:32)  magnesium oxide 400 mg (240 mg elemental magnesium) oral tablet: 1 tab(s) orally once a day (19 Dec 2019 21:32)  tramadol-acetaminophen 37.5 mg-325 mg oral tablet: 1 tab(s) orally every 6 hours (19 Dec 2019 21:32)      Vital Signs Last 24 Hrs  T(C): 36.6 (20 Dec 2019 13:52), Max: 36.6 (20 Dec 2019 13:52)  T(F): 97.8 (20 Dec 2019 13:52), Max: 97.8 (20 Dec 2019 13:52)  HR: 77 (20 Dec 2019 13:52) (67 - 83)  BP: 138/77 (20 Dec 2019 13:52) (111/53 - 141/67)  BP(mean): --  RR: 18 (20 Dec 2019 13:52) (18 - 18)  SpO2: 93% (20 Dec 2019 08:52) (93% - 96%)    PHYSICAL EXAM  General:   HEENT:   CV:   Lungs:   Abdomen:  Ext:   Neuro:   MEDICATIONS  (STANDING):  gabapentin 100 milliGRAM(s) Oral three times a day  heparin  Injectable 5000 Unit(s) SubCutaneous every 8 hours  pantoprazole    Tablet 40 milliGRAM(s) Oral before breakfast    MEDICATIONS  (PRN):  traMADol 25 milliGRAM(s) Oral three times a day PRN Moderate Pain (4 - 6)      LABS:                          8.6    5.44  )-----------( 305      ( 20 Dec 2019 06:48 )             27.3         Mean Cell Volume : 93.5 fL  Mean Cell Hemoglobin : 29.5 pg  Mean Cell Hemoglobin Concentration : 31.5 g/dL  Auto Neutrophil # : 3.55 K/uL  Auto Lymphocyte # : 1.14 K/uL  Auto Monocyte # : 0.55 K/uL  Auto Eosinophil # : 0.12 K/uL  Auto Basophil # : 0.04 K/uL  Auto Neutrophil % : 65.3 %  Auto Lymphocyte % : 21.0 %  Auto Monocyte % : 10.1 %  Auto Eosinophil % : 2.2 %  Auto Basophil % : 0.7 %      Serial CBC's   @ 06:48  Hct-27.3 / Hgb-8.6 / Plat-305 / RBC-2.92 / WBC-5.44  Serial CBC's   @ 16:58  Hct-29.1 / Hgb-9.2 / Plat-337 / RBC-3.13 / WBC-7.18          143  |  106  |  52<H>  ----------------------------<  151<H>  5.2<H>   |  21  |  2.8<H>    Ca    8.4<L>      20 Dec 2019 06:48  Mg     1.3         TPro  6.1  /  Alb  3.0<L>  /  TBili  0.4  /  DBili  <0.2  /  AST  15  /  ALT  <5  /  AlkPhos  54            Iron - Total Binding Capacity.: 189 ug/dL ( @ 06:48)              Urinalysis Basic - ( 19 Dec 2019 16:42 )    Color: Yellow / Appearance: Clear / S.019 / pH: x  Gluc: x / Ketone: Negative  / Bili: Negative / Urobili: <2 mg/dL   Blood: x / Protein: 30 mg/dL / Nitrite: Negative   Leuk Esterase: Moderate / RBC: 6 /HPF / WBC 32 /HPF   Sq Epi: x / Non Sq Epi: 3 /HPF / Bacteria: Negative                BLOOD SMEAR INTERPRETATION:       RADIOLOGY & ADDITIONAL STUDIES: Patient is a 85y old  Female who presents with a chief complaint of Abdominal pain (20 Dec 2019 08:10)      HPI:  Patient speaks Greek and poor historian and is legally blind and very hard of hearing; Her daughter did the translation as per the request from patient.    86 y/o F with PMH of CKD, HTN, DM, CAD, CHF, osteoarthritis, GERD, spinal stenosis sent from PMD abdominal pain for past 4 weeks. Gradual onset, dull aching pain, no aggravating or relieving factors; associated with  decreased oral intake and weight loss, nausea, abdominal distension for 1 month duration  She also reports decreased urine output for last 1 week.  Patient has normal bowel habit but reports decreased amount of BM which she attributes to decreased oral intake. She passes flatus+     She lives alone and was independent with her daily activities until a month back with help from family; she used to go to the  5 days a week.  She has been feeling discomfort for longer than a month but since she is always complaining; family thought she will get better with time but instead she kept on getting worse and abdomen started to distend; Then she was brought to see her PMD Dr Rey Carpenter in [place] who referred to the ED.    In the ED,   vitals stable  Bedside ultrasound ascitis  CT abdomen ascitis, with ?metastatic ovarian cancer but no bowel obstruction or transition point. (19 Dec 2019 20:29)         PAST MEDICAL & SURGICAL HISTORY:  CAD (coronary artery disease)  Spinal stenosis  CKD (chronic kidney disease)  GERD (gastroesophageal reflux disease)  Dyslipidemia  HTN (hypertension)  DM (diabetes mellitus)  S/P appendectomy      SOCIAL HISTORY:    FAMILY HISTORY:    Allergies    contrast media (iodine-based) (Anaphylaxis)  insulin (Unknown)  procaine (Unknown)    Intolerances      ROS:  Negative except for:        Height (cm): 157.48 (19 @ 16:15)  Weight (kg): 72.6 (19 @ 16:15)  BMI (kg/m2): 29.3 (19 @ 16:15)  BSA (m2): 1.74 (19 @ 16:15)      HOME MEDICATIONS:  carvedilol 25 mg oral tablet: 1 tab(s) orally 2 times a day (19 Dec 2019 21:32)  gabapentin 100 mg oral capsule: 1 cap(s) orally 3 times a day (19 Dec 2019 21:32)  glipiZIDE 10 mg oral tablet: 1 tab(s) orally once a day (19 Dec 2019 21:32)  Hyzaar 100 mg-25 mg oral tablet: 1 tab(s) orally once a day (19 Dec 2019 21:32)  Janumet 50 mg-1000 mg oral tablet: 1 tab(s) orally 2 times a day (19 Dec 2019 21:32)  magnesium oxide 400 mg (240 mg elemental magnesium) oral tablet: 1 tab(s) orally once a day (19 Dec 2019 21:32)  tramadol-acetaminophen 37.5 mg-325 mg oral tablet: 1 tab(s) orally every 6 hours (19 Dec 2019 21:32)      Vital Signs Last 24 Hrs  T(C): 36.6 (20 Dec 2019 13:52), Max: 36.6 (20 Dec 2019 13:52)  T(F): 97.8 (20 Dec 2019 13:52), Max: 97.8 (20 Dec 2019 13:52)  HR: 77 (20 Dec 2019 13:52) (67 - 83)  BP: 138/77 (20 Dec 2019 13:52) (111/53 - 141/67)  BP(mean): --  RR: 18 (20 Dec 2019 13:52) (18 - 18)  SpO2: 93% (20 Dec 2019 08:52) (93% - 96%)    PHYSICAL EXAM  General: lying on bed , medical team attempting to do para and pt is c/o pain, complete physical exam not performed    Abdomen: distended   Neuro: AOX 3   MEDICATIONS  (STANDING):  gabapentin 100 milliGRAM(s) Oral three times a day  heparin  Injectable 5000 Unit(s) SubCutaneous every 8 hours  pantoprazole    Tablet 40 milliGRAM(s) Oral before breakfast    MEDICATIONS  (PRN):  traMADol 25 milliGRAM(s) Oral three times a day PRN Moderate Pain (4 - 6)      LABS:                          8.6    5.44  )-----------( 305      ( 20 Dec 2019 06:48 )             27.3         Mean Cell Volume : 93.5 fL  Mean Cell Hemoglobin : 29.5 pg  Mean Cell Hemoglobin Concentration : 31.5 g/dL  Auto Neutrophil # : 3.55 K/uL  Auto Lymphocyte # : 1.14 K/uL  Auto Monocyte # : 0.55 K/uL  Auto Eosinophil # : 0.12 K/uL  Auto Basophil # : 0.04 K/uL  Auto Neutrophil % : 65.3 %  Auto Lymphocyte % : 21.0 %  Auto Monocyte % : 10.1 %  Auto Eosinophil % : 2.2 %  Auto Basophil % : 0.7 %      Serial CBC's   @ 06:48  Hct-27.3 / Hgb-8.6 / Plat-305 / RBC-2.92 / WBC-5.44  Serial CBC's   @ 16:58  Hct-29.1 / Hgb-9.2 / Plat-337 / RBC-3.13 / WBC-7.18          143  |  106  |  52<H>  ----------------------------<  151<H>  5.2<H>   |  21  |  2.8<H>    Ca    8.4<L>      20 Dec 2019 06:48  Mg     1.3         TPro  6.1  /  Alb  3.0<L>  /  TBili  0.4  /  DBili  <0.2  /  AST  15  /  ALT  <5  /  AlkPhos  54            Iron - Total Binding Capacity.: 189 ug/dL ( @ 06:48)              Urinalysis Basic - ( 19 Dec 2019 16:42 )    Color: Yellow / Appearance: Clear / S.019 / pH: x  Gluc: x / Ketone: Negative  / Bili: Negative / Urobili: <2 mg/dL   Blood: x / Protein: 30 mg/dL / Nitrite: Negative   Leuk Esterase: Moderate / RBC: 6 /HPF / WBC 32 /HPF   Sq Epi: x / Non Sq Epi: 3 /HPF / Bacteria: Negative                BLOOD SMEAR INTERPRETATION:       RADIOLOGY & ADDITIONAL STUDIES:  < from: CT Abdomen and Pelvis No Cont (19 @ 19:23) >  IMPRESSION:    3.5 cm, thick-walled left adnexal cystic mass with large volume of ascites, omental caking, and lymphadenopathy. The findings are most consistent with GYN malignancy (especially ovarian carcinoma) with metastatic disease within the abdomen.      The findings were discussed with Dr. Francisca Meneses at 8:00 PM 2019, with read back.                < end of copied text >

## 2019-12-20 NOTE — CONSULT NOTE ADULT - ASSESSMENT
86 y/o F with PMH of CKD, HTN, DM, CAD, CHF, osteoarthritis, GERD, spinal stenosis sent from PMD abdominal pain for past 4 weeks. Gradual onset, dull aching pain, no aggravating or relieving factors; associated with  decreased oral intake and weight loss, nausea, abdominal distension for 1 month duration  She also reports decreased urine output for last 1 week. 86 y/o F with PMH of CKD, HTN, DM, CAD, CHF, osteoarthritis, GERD, spinal stenosis sent from PMD abdominal pain for past 4 weeks. Gradual onset, dull aching pain, no aggravating or relieving factors; associated with  decreased oral intake and weight loss, nausea, abdominal distension for 1 month duration  She also reports decreased urine output for last 1 week.    # Left adnexal cystic mass with large volume of ascites, omental caking, and lymphadenopathy Suggestive of underlying malignancy:  - Likely GYN in origin . Had detailed discussion with pt and family , Pt herself does not want to have any interventions done. She is even refusing Paracentesis . Family on the other hand are interested in knowing the diagnosis. Pt is very frail , in order for us to offer her any treatment , we would recommend biopsy , pt does not want biopsy and further w/u . We respect her wishes .  - Medical team attempted to do paracentesis at bedside but were unscucessful , can call IR to see if therapeutic para could be done and if pt agrees.  - Small sample that was obtained from para is submitted for cytology .  - After detailed discussion family is still hesitant in making decision , also leaning towards Hospice but too in [facility] . If they still want to know diagnosis will call them with results of para.

## 2019-12-20 NOTE — CONSULT NOTE ADULT - ATTENDING COMMENTS
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    I had a long discussion with the family. The patient expressed no interest in treatment, but she did agree to a  palliative paracentesis. The resident team was attempting a paracentesis when I was there, but unfortunately only minimal fluid was obtained. She does not wish for any more procedures.    I had a long talk with the family, we  went over prognosis as well as possible complications, including SBO. They will consider hospice in the future, but do not think she is ready. They will be interested in a facility in [place] , although they all live in [place].    I suggested that they speak to her to see if  she be willing to perform another procedure in order to alleviate the pressure on Monday. By our interventional team and they will discuss this with her. It could also arrange paracentesis as outpatient. This would be in [place].    Once she is ready for hospice they can reach out to me and we can help.    At this point recommend supportive measures only thank you for the consultation discussed with residents about side.    There was minimal peritoneal fluid, but will be sent for cytology.

## 2019-12-20 NOTE — CONSULT NOTE ADULT - SUBJECTIVE AND OBJECTIVE BOX
REQUESTED OF: DR Westbrook    CLINICAL ISSUE TO BE EVALUATED BY CONSULTANT: GOals of care/pain mngmnt        MILDRED KHAN 85yFemale  HPI:  Patient speaks Syrian and poor historian and is legally blind and very hard of hearing; Her daughter did the translation as per the request from patient.    84 y/o F with PMH of CKD, HTN, DM, CAD, CHF, osteoarthritis, GERD, spinal stenosis sent from PMD abdominal pain for past 4 weeks. Gradual onset, dull aching pain, no aggravating or relieving factors; associated with  decreased oral intake and weight loss, nausea, abdominal distension for 1 month duration  She also reports decreased urine output for last 1 week.  Patient has normal bowel habit but reports decreased amount of BM which she attributes to decreased oral intake. She passes flatus+     She lives alone and was independent with her daily activities until a month back with help from family; she used to go to the  5 days a week.  She has been feeling discomfort for longer than a month but since she is always complaining; family thought she will get better with time but instead she kept on getting worse and abdomen started to distend; Then she was brought to see her PMD Dr Rey Carpenter in [place] who referred to the ED.    In the ED,   vitals stable  Bedside ultrasound ascitis  CT abdomen ascitis, with ?metastatic ovarian cancer but no bowel obstruction or transition point. (19 Dec 2019 20:29        PAST MEDICAL & SURGICAL HISTORY:  CAD (coronary artery disease)  Spinal stenosis  CKD (chronic kidney disease)  GERD (gastroesophageal reflux disease)  Dyslipidemia  HTN (hypertension)  DM (diabetes mellitus)  S/P appendectomy      Pt seen on medical floor, two daughters and grandson at bedside. Pt a & o x 3. Reports pain is well controlled, c/o increased abdominal girth making it difficult to eat and walk. No dyspnea. No bladder/bowel abnormalities        PHYSICAL EXAM:  Elderly Syrian speaking lady, a & o x 3, nad, ambulatory  PERRL  RRR  Abdom ++ ascites, soft, minimally tender diffusely, no guarding  no edema  no focal deficits          T(C): 36.6, Max: 36.6 (13:52)  HR: 77 (67 - 83)  BP: 138/77 (111/53 - 141/67)  RR: 18 (18 - 18)  SpO2: 93% (93% - 96%)      LABS/STUDIES:  12-20    143  |  106  |  52<H>  ----------------------------<  151<H>  5.2<H>   |  21  |  2.8<H>    Ca    8.4<L>      20 Dec 2019 06:48  Mg     1.3     12-20    TPro  6.1  /  Alb  3.0<L>  /  TBili  0.4  /  DBili  <0.2  /  AST  15  /  ALT  <5  /  AlkPhos  54  12-20                            8.6    5.44  )-----------( 305      ( 20 Dec 2019 06:48 )             27.3       MEDICATIONS  (STANDING):  gabapentin 100 milliGRAM(s) Oral three times a day  heparin  Injectable 5000 Unit(s) SubCutaneous every 8 hours  pantoprazole    Tablet 40 milliGRAM(s) Oral before breakfast    MEDICATIONS  (PRN):  traMADol 25 milliGRAM(s) Oral three times a day PRN Moderate Pain (4 - 6)        [place] Symptom Assesment Scale      PPS (Palliative Performance Scale): 60

## 2019-12-20 NOTE — CHART NOTE - NSCHARTNOTEFT_GEN_A_CORE
Patient likely has metastatic cancer. As per d/w patient family, they prefer she receives the news of her disease/prognosis from the family, preferably with palliative team. Palliative team consult placed.

## 2019-12-20 NOTE — PROGRESS NOTE ADULT - SUBJECTIVE AND OBJECTIVE BOX
HPI:  Patient speaks New Zealander and poor historian and is legally blind and very hard of hearing; Her daughter did the translation as per the request from patient.    86 y/o F with PMH of CKD, HTN, DM, CAD, CHF, osteoarthritis, GERD, spinal stenosis sent from PMD abdominal pain for past 4 weeks. Gradual onset, dull aching pain, no aggravating or relieving factors; associated with  decreased oral intake and weight loss, nausea, abdominal distension for 1 month duration  She also reports decreased urine output for last 1 week.  Patient has normal bowel habit but reports decreased amount of BM which she attributes to decreased oral intake. She passes flatus+     She lives alone and was independent with her daily activities until a month back with help from family; she used to go to the  5 days a week.  She has been feeling discomfort for longer than a month but since she is always complaining; family thought she will get better with time but instead she kept on getting worse and abdomen started to distend; Then she was brought to see her PMD Dr Rey Carpenter in [place] who referred to the ED.    In the ED,   vitals stable  Bedside ultrasound ascitis  CT abdomen ascitis, with ?metastatic ovarian cancer but no bowel obstruction or transition point. (19 Dec 2019 20:29)    Currently admitted to medicine with the primary diagnosis of Peritoneal carcinomatosis     Today is hospital day 1d.     INTERVAL HPI / OVERNIGHT EVENTS:  Patient was examined and seen at bedside. This morning she is resting comfortably in bed and reports no new issues or overnight events.     ROS: Otherwise unremarkable     PAST MEDICAL & SURGICAL HISTORY  CAD (coronary artery disease)  Spinal stenosis  CKD (chronic kidney disease)  GERD (gastroesophageal reflux disease)  Dyslipidemia  HTN (hypertension)  DM (diabetes mellitus)  S/P appendectomy    ALLERGIES  contrast media (iodine-based) (Anaphylaxis)  insulin (Unknown)  procaine (Unknown)    MEDICATIONS  STANDING MEDICATIONS  dextrose 5%. 1000 milliLiter(s) IV Continuous <Continuous>  gabapentin 100 milliGRAM(s) Oral three times a day  heparin  Injectable 5000 Unit(s) SubCutaneous every 8 hours  pantoprazole    Tablet 40 milliGRAM(s) Oral before breakfast    PRN MEDICATIONS  traMADol 25 milliGRAM(s) Oral three times a day PRN    VITALS:  T(F): 97.4  HR: 67  BP: 111/53  RR: 18  SpO2: 93%    PHYSICAL EXAM  GEN: NAD, Resting comfortably in bed  PULM: Clear to auscultation bilaterally, No wheezes  CVS: Regular rate and rhythm, S1-S2, no murmurs  ABD: Soft, non-tender, non-distended, no guarding  EXT: No edema  NEURO: AAOx3, no focal deficits    LABS                        8.6    5.44  )-----------( 305      ( 20 Dec 2019 06:48 )             27.3         143  |  106  |  52<H>  ----------------------------<  151<H>  5.2<H>   |  21  |  2.8<H>    Ca    8.4<L>      20 Dec 2019 06:48  Mg     1.3         TPro  6.1  /  Alb  3.0<L>  /  TBili  0.4  /  DBili  <0.2  /  AST  15  /  ALT  <5  /  AlkPhos  54        Urinalysis Basic - ( 19 Dec 2019 16:42 )    Color: Yellow / Appearance: Clear / S.019 / pH: x  Gluc: x / Ketone: Negative  / Bili: Negative / Urobili: <2 mg/dL   Blood: x / Protein: 30 mg/dL / Nitrite: Negative   Leuk Esterase: Moderate / RBC: 6 /HPF / WBC 32 /HPF   Sq Epi: x / Non Sq Epi: 3 /HPF / Bacteria: Negative        Lactate, Blood: 1.1 mmol/L (19 @ 16:58)      RADIOLOGY  < from: CT Abdomen and Pelvis No Cont (19 @ 19:23) >  IMPRESSION:    3.5 cm, thick-walled left adnexal cystic mass with large volume of ascites, omental caking, and lymphadenopathy. The findings are most consistent with GYN malignancy (especially ovarian carcinoma) with metastatic disease within the abdomen.      The findings were discussed with Dr. Francisca Meneses at 8:00 PM 2019, with read back.    < end of copied text >    < from: Xray Chest 1 View AP/PA (19 @ 17:34) >  Impression:      No radiographic evidence of acute pulmonary disease.    < end of copied text > HPI:  Patient speaks Mauritian and poor historian and is legally blind and very hard of hearing; Her daughter did the translation as per the request from patient.    84 y/o F with PMH of CKD, HTN, DM, CAD, CHF, osteoarthritis, GERD, spinal stenosis sent from PMD abdominal pain for past 4 weeks. Gradual onset, dull aching pain, no aggravating or relieving factors; associated with  decreased oral intake and weight loss, nausea, abdominal distension for 1 month duration  She also reports decreased urine output for last 1 week.  Patient has normal bowel habit but reports decreased amount of BM which she attributes to decreased oral intake. She passes flatus+     She lives alone and was independent with her daily activities until a month back with help from family; she used to go to the  5 days a week.  She has been feeling discomfort for longer than a month but since she is always complaining; family thought she will get better with time but instead she kept on getting worse and abdomen started to distend; Then she was brought to see her PMD Dr Rey Carpenter in [place] who referred to the ED.    In the ED,   Vitals stable. Bedside ultrasound showed ascitis. CT abdomen ascitis, with ?metastatic ovarian cancer but no bowel obstruction or transition point. (19 Dec 2019 20:29)    Currently admitted to medicine with the primary diagnosis of Peritoneal carcinomatosis     Today is hospital day 1d.     INTERVAL HPI / OVERNIGHT EVENTS:  Patient was examined and seen at bedside. This morning she is resting comfortably in bed and reports no new issues or overnight events. The patient is [place] speaking and conversational. Mentions that she has abdominal discomfort that has progressed over the course of 2 weeks. Has some pain on deep inhalation. The patient states that she knows she probably has cancer and "my time will come eventually, I don't want to have any surgery". The patient was hard of hearing and reports having cataracts and glaucoma in both eyes leading to severe difficulty with vision. Denied CP, SOB, abdominal pain, or changes in BM or urinary habits.      ROS: Otherwise unremarkable     PAST MEDICAL & SURGICAL HISTORY  CAD (coronary artery disease)  Spinal stenosis  CKD (chronic kidney disease)  GERD (gastroesophageal reflux disease)  Dyslipidemia  HTN (hypertension)  DM (diabetes mellitus)  S/P appendectomy    ALLERGIES  contrast media (iodine-based) (Anaphylaxis)  insulin (Unknown)  procaine (Unknown)    MEDICATIONS  STANDING MEDICATIONS  dextrose 5%. 1000 milliLiter(s) IV Continuous <Continuous>  gabapentin 100 milliGRAM(s) Oral three times a day  heparin  Injectable 5000 Unit(s) SubCutaneous every 8 hours  pantoprazole    Tablet 40 milliGRAM(s) Oral before breakfast    PRN MEDICATIONS  traMADol 25 milliGRAM(s) Oral three times a day PRN    VITALS:  T(F): 97.4  HR: 67  BP: 111/53  RR: 18  SpO2: 93%    PHYSICAL EXAM  GEN: NAD, Resting comfortably in bed  PULM: Clear to auscultation bilaterally, No wheezes  CVS: S1/S2  ABD: Large and firm  NEURO: AAOx3, no focal deficits    LABS                        8.6    5.44  )-----------( 305      ( 20 Dec 2019 06:48 )             27.3         143  |  106  |  52<H>  ----------------------------<  151<H>  5.2<H>   |  21  |  2.8<H>    Ca    8.4<L>      20 Dec 2019 06:48  Mg     1.3         TPro  6.1  /  Alb  3.0<L>  /  TBili  0.4  /  DBili  <0.2  /  AST  15  /  ALT  <5  /  AlkPhos  54        Urinalysis Basic - ( 19 Dec 2019 16:42 )    Color: Yellow / Appearance: Clear / S.019 / pH: x  Gluc: x / Ketone: Negative  / Bili: Negative / Urobili: <2 mg/dL   Blood: x / Protein: 30 mg/dL / Nitrite: Negative   Leuk Esterase: Moderate / RBC: 6 /HPF / WBC 32 /HPF   Sq Epi: x / Non Sq Epi: 3 /HPF / Bacteria: Negative        Lactate, Blood: 1.1 mmol/L (19 @ 16:58)      RADIOLOGY  < from: CT Abdomen and Pelvis No Cont (19 @ 19:23) >  IMPRESSION:    3.5 cm, thick-walled left adnexal cystic mass with large volume of ascites, omental caking, and lymphadenopathy. The findings are most consistent with GYN malignancy (especially ovarian carcinoma) with metastatic disease within the abdomen.      The findings were discussed with Dr. Francisca Meneses at 8:00 PM 2019, with read back.    < end of copied text >    < from: Xray Chest 1 View AP/PA (19 @ 17:34) >  Impression:      No radiographic evidence of acute pulmonary disease.    < end of copied text >

## 2019-12-20 NOTE — CONSULT NOTE ADULT - ASSESSMENT
85yFemale being evaluated for pain mngnt and goals of care in setting of likely advanced GYN malignancy (?ovarian) with increasing ascites.     Palliative care services introduced by Palliative SW who is fluent in [place].  Pt aware of her current working diagnosis. She defers any further medical work up at this time as she wishes to live through the natural course of her illness.  She is agreeable for paracentesis for comfort. She is aware the ascites may reaccumulate in the future and reports her pain is well controlled w tramadol use. Pt verbalized her wishes to defer any invasive life sustaining measures. Her goal is to return home with her cat, although her family discloses she lives alone and cannot care for herself any more.    Hospice care introduced to family to consider for dispo planning if she cannot participate w rehab and does not wish for further work up/treatment or medical interventions.   45 minutes spent discussing advanced care planning  All questions answered in detail.      Recommendations:  change tramadol: 25mg po q3h PRN pain   Initiate DNR/DNI status, MOLST completed   Hospice c/s  paracentesis today     We will follow  [phone]

## 2019-12-21 VITALS — WEIGHT: 110.23 LBS

## 2019-12-21 LAB
ANION GAP SERPL CALC-SCNC: 13 MMOL/L — SIGNIFICANT CHANGE UP (ref 7–14)
APTT BLD: 27.1 SEC — SIGNIFICANT CHANGE UP (ref 27–39.2)
BLD GP AB SCN SERPL QL: SIGNIFICANT CHANGE UP
BUN SERPL-MCNC: 51 MG/DL — HIGH (ref 10–20)
CALCIUM SERPL-MCNC: 8.5 MG/DL — SIGNIFICANT CHANGE UP (ref 8.5–10.1)
CHLORIDE SERPL-SCNC: 108 MMOL/L — SIGNIFICANT CHANGE UP (ref 98–110)
CO2 SERPL-SCNC: 21 MMOL/L — SIGNIFICANT CHANGE UP (ref 17–32)
CREAT SERPL-MCNC: 2.7 MG/DL — HIGH (ref 0.7–1.5)
GLUCOSE BLDC GLUCOMTR-MCNC: 114 MG/DL — HIGH (ref 70–99)
GLUCOSE BLDC GLUCOMTR-MCNC: 92 MG/DL — SIGNIFICANT CHANGE UP (ref 70–99)
GLUCOSE SERPL-MCNC: 100 MG/DL — HIGH (ref 70–99)
HCT VFR BLD CALC: 28.4 % — LOW (ref 37–47)
HGB BLD-MCNC: 8.9 G/DL — LOW (ref 12–16)
INR BLD: 1.29 RATIO — SIGNIFICANT CHANGE UP (ref 0.65–1.3)
MAGNESIUM SERPL-MCNC: 1.3 MG/DL — LOW (ref 1.8–2.4)
MCHC RBC-ENTMCNC: 29.1 PG — SIGNIFICANT CHANGE UP (ref 27–31)
MCHC RBC-ENTMCNC: 31.3 G/DL — LOW (ref 32–37)
MCV RBC AUTO: 92.8 FL — SIGNIFICANT CHANGE UP (ref 81–99)
NRBC # BLD: 0 /100 WBCS — SIGNIFICANT CHANGE UP (ref 0–0)
PLATELET # BLD AUTO: 299 K/UL — SIGNIFICANT CHANGE UP (ref 130–400)
POTASSIUM SERPL-MCNC: 5.1 MMOL/L — HIGH (ref 3.5–5)
POTASSIUM SERPL-SCNC: 5.1 MMOL/L — HIGH (ref 3.5–5)
PROTHROM AB SERPL-ACNC: 14.8 SEC — HIGH (ref 9.95–12.87)
RBC # BLD: 3.06 M/UL — LOW (ref 4.2–5.4)
RBC # FLD: 12 % — SIGNIFICANT CHANGE UP (ref 11.5–14.5)
SODIUM SERPL-SCNC: 142 MMOL/L — SIGNIFICANT CHANGE UP (ref 135–146)
WBC # BLD: 7.22 K/UL — SIGNIFICANT CHANGE UP (ref 4.8–10.8)
WBC # FLD AUTO: 7.22 K/UL — SIGNIFICANT CHANGE UP (ref 4.8–10.8)

## 2019-12-21 PROCEDURE — 99222 1ST HOSP IP/OBS MODERATE 55: CPT

## 2019-12-21 PROCEDURE — 99239 HOSP IP/OBS DSCHRG MGMT >30: CPT

## 2019-12-21 RX ORDER — FUROSEMIDE 40 MG
1 TABLET ORAL
Qty: 30 | Refills: 1
Start: 2019-12-21 | End: 2020-02-18

## 2019-12-21 RX ORDER — DOCUSATE SODIUM 100 MG
1 CAPSULE ORAL
Qty: 60 | Refills: 0
Start: 2019-12-21 | End: 2020-01-19

## 2019-12-21 RX ORDER — LOSARTAN POTASSIUM 100 MG/1
1 TABLET, FILM COATED ORAL
Qty: 30 | Refills: 0
Start: 2019-12-21 | End: 2020-01-19

## 2019-12-21 RX ORDER — SENNA PLUS 8.6 MG/1
2 TABLET ORAL
Qty: 60 | Refills: 0
Start: 2019-12-21 | End: 2020-01-19

## 2019-12-21 RX ORDER — LOSARTAN/HYDROCHLOROTHIAZIDE 100MG-25MG
1 TABLET ORAL
Qty: 0 | Refills: 0 | DISCHARGE

## 2019-12-21 NOTE — CHART NOTE - NSCHARTNOTEFT_GEN_A_CORE
85 year old female patient with hx of CAD, unspecified CHF presented with abdominal distention. Imaging revealed metastatic disease suspicious for ovarian in origin. Patient would be needing prolonged hours of home care services and visiting nurse to take care of her in daily routine activities. 85 year old female patient with hx of CAD, unspecified CHF presented with abdominal distention. Imaging revealed metastatic disease suspicious for ovarian in origin. Patient would benefit from increase hours for home health aide services to take care of her in daily routine activities.

## 2019-12-21 NOTE — DISCHARGE NOTE PROVIDER - CARE PROVIDER_API CALL
Jayden Montanez)  Hematology; Internal Medicine; Medical Oncology  [address] [place] [address]  Phone: [phone]  Fax: [phone]  Follow Up Time: 2 weeks

## 2019-12-21 NOTE — DIETITIAN INITIAL EVALUATION ADULT. - PERTINENT LABORATORY DATA
(12/21/19) RBC 3.06, H/H 8.9/28.4, POCT 92/ 114, glucose 100, K+ 5.1, BUN 51, Cr 2.7, GFR 15, Mg 1.3

## 2019-12-21 NOTE — DIETITIAN INITIAL EVALUATION ADULT. - FACTORS AFF FOOD INTAKE
Persistent abd pain, early satiety and distention noted. denies n/v or difficulty chewing/swallowing. LBM 12/19

## 2019-12-21 NOTE — DIETITIAN INITIAL EVALUATION ADULT. - OTHER INFO
Pt p/w abd pain worsening over 4 weeks. Pt found to have L adnexal cystic mass with large volume of ascites, omental caking, and lymphadenopathy, suggestive of underlying malignancy likely GYN region. Per heme/onc: pt does not want any interventions done, no biopsy, treatment or ongoing paracentesis. Agreed to palliative paracentesis yesterday 12/20 but only 10mL removed. Per palliative: pt has deferred any life sustaining measures, would like to return home with her cat. Hospice c/s placed to determine d/c plans.  VICENTE on CKD, T2DM and anemia also noted.

## 2019-12-21 NOTE — DIETITIAN INITIAL EVALUATION ADULT. - ENERGY NEEDS
estimated calorie needs = 7749-8497 kcal/day (30-35 kcal/kg IBW d/t malignancy with suspected wt loss).   estimated protein needs = 65-75 g/day (1.3-1.5 g/kg IBW, reason mentioned above).  estimated fluid needs = per LIP

## 2019-12-21 NOTE — DISCHARGE NOTE NURSING/CASE MANAGEMENT/SOCIAL WORK - PATIENT PORTAL LINK FT
You can access the FollowMyHealth Patient Portal offered by [facility] by registering at the following website: [email or web address]. By joining [facility]’s FollowMyHealth portal, you will also be able to view your health information using other applications (apps) compatible with our system.

## 2019-12-21 NOTE — DISCHARGE NOTE PROVIDER - NSDCMRMEDTOKEN_GEN_ALL_CORE_FT
carvedilol 25 mg oral tablet: 1 tab(s) orally 2 times a day  docusate sodium 100 mg oral tablet: 1 tab(s) orally 2 times a day   gabapentin 100 mg oral capsule: 1 cap(s) orally 3 times a day  glipiZIDE 10 mg oral tablet: 1 tab(s) orally once a day  Janumet 50 mg-1000 mg oral tablet: 1 tab(s) orally 2 times a day  Lasix 40 mg oral tablet: 1 tab(s) orally once a day   losartan 100 mg oral tablet: 1 tab(s) orally once a day   magnesium oxide 400 mg (240 mg elemental magnesium) oral tablet: 1 tab(s) orally once a day  Percocet 5/325 oral tablet: 1 tab(s) orally every 6 hours MDD:4  tabs  senna 8.6 mg oral tablet: 2 tab(s) orally once a day (at bedtime)   tramadol-acetaminophen 37.5 mg-325 mg oral tablet: 1 tab(s) orally every 6 hours

## 2019-12-21 NOTE — CONSULT NOTE ADULT - SUBJECTIVE AND OBJECTIVE BOX
PODIATRY CONSULT   MILDRED KHAN is a 85y old  Female who presents with a chief complaint of Abdominal pain (21 Dec 2019 11:58)    HPI:  Patient speaks Czech and poor historian and is legally blind and very hard of hearing; Her daughter did the translation as per the request from patient.    86 y/o F with PMH of CKD, HTN, DM, CAD, CHF, osteoarthritis, GERD, spinal stenosis sent from PMD abdominal pain for past 4 weeks. Gradual onset, dull aching pain, no aggravating or relieving factors; associated with  decreased oral intake and weight loss, nausea, abdominal distension for 1 month duration  She also reports decreased urine output for last 1 week.  Patient has normal bowel habit but reports decreased amount of BM which she attributes to decreased oral intake. She passes flatus+     She lives alone and was independent with her daily activities until a month back with help from family; she used to go to the  5 days a week.  She has been feeling discomfort for longer than a month but since she is always complaining; family thought she will get better with time but instead she kept on getting worse and abdomen started to distend; Then she was brought to see her PMD Dr Rey Carpenter in [place] who referred to the ED.    In the ED,   vitals stable  Bedside ultrasound ascitis  CT abdomen ascitis, with ?metastatic ovarian cancer but no bowel obstruction or transition point. (19 Dec 2019 20:29)      Podiatry - left hallux pain s/p removal of ingrowing of left hallux nail as o/p     PERITONEAL CARCINOMATOSIS  CAD (coronary artery disease)  Spinal stenosis  CKD (chronic kidney disease)  GERD (gastroesophageal reflux disease)  Dyslipidemia  HTN (hypertension)  DM (diabetes mellitus)  No pertinent past medical history      PMH: PERITONEAL CARCINOMATOSIS  CAD (coronary artery disease)  Spinal stenosis  CKD (chronic kidney disease)  GERD (gastroesophageal reflux disease)  Dyslipidemia  HTN (hypertension)  DM (diabetes mellitus)  No pertinent past medical history    PSH: S/P appendectomy  No significant past surgical history    Medication   Allergy: contrast media (iodine-based) (Anaphylaxis)  insulin (Unknown)  procaine (Unknown)        Labs:                        8.9    7.22  )-----------( 299      ( 21 Dec 2019 07:45 )             28.4     PT/INR - ( 21 Dec 2019 07:45 )   PT: 14.80 sec;   INR: 1.29 ratio         PTT - ( 21 Dec 2019 07:45 )  PTT:27.1 sec  12-21    142  |  108  |  51<H>  ----------------------------<  100<H>  5.1<H>   |  21  |  2.7<H>    Ca    8.5      21 Dec 2019 07:45  Mg     1.3     12-21    TPro  6.1  /  Alb  3.0<L>  /  TBili  0.4  /  DBili  <0.2  /  AST  15  /  ALT  <5  /  AlkPhos  54  12-20          Culture - Urine (collected 19 Dec 2019 16:42)  Source: .Urine Clean Catch (Midstream)  Final Report (20 Dec 2019 23:22):    <10,000 CFU/mL Normal Urogenital Su        ROS:  [ ]A ten point review of system was otherwise negative except as noted    Physical Exam - Lower Extremity Focused:   Derm:   No ulceration. Small scab at the corner of the nail plate. no ischemic changes. No signs of infection. Mild erythema and swelling 2/2 irritation form the nail. \  Xerosis B/L feet   Vascular:   DP and PT pulses non-palpable .  Neuro:  - + Pain on light touch of the nail corner  MSK:   Manual Muscle strength 4/5 in all muscle compartments        Assessment:   ingrowing toenail left hallux - No ischemic changes. No signs of infection  Plan:  Chart reviewed and Patient evaluated  Discussed diagnosis and treatment with patient's family member at bedside  May require nail avulsion - considering pt overall health no intervention is necessary at this time.  If pt get D/C and pain gets worse then needs to follow as o/p for possible nail avulsion.   Wound Care Orders: Please apply bacitracin/DSD to Left hallux  No need for abx  Will discuss care plan  with  Attending

## 2019-12-21 NOTE — DIETITIAN INITIAL EVALUATION ADULT. - PERTINENT MEDS FT
Benefits, risks, and possible complications of procedure explained to patient/caregiver who verbalized understanding and gave written consent.
heparin, gabapentin, protonix

## 2019-12-21 NOTE — DIETITIAN INITIAL EVALUATION ADULT. - ENERGY INTAKE
observed untouched lunch tray. P6t can only tolerate ~25% portion sizes at best d/t early satiety and discomfort from ascites. Pt has denied life sustaining measures as per palliative care note but not full CMO at this time. will continue with PO diet only until GOC established.

## 2019-12-21 NOTE — DIETITIAN INITIAL EVALUATION ADULT. - CONTINUE CURRENT NUTRITION CARE PLAN
1. Continue with PO diet only as per pt wishes at this time. Add Mechanical soft modifier and low fiber. Can hold CHO consistent restriction at this time as pt consuming very little at this time. Small/frequent meals to optimize tolerance. 2. Add Ensure compact BID. will assess supplement tolerance and decide further recs from there.

## 2019-12-21 NOTE — DISCHARGE NOTE PROVIDER - HOSPITAL COURSE
Elderly female patient with hx of CAD, unspecified CHF presented with abdominal distention. Imaging revealed metastatic disease suspicious for ovarian in origin. Patient had a bedside paracentesis attempt which was unsuccessful. She refused further attempts and further work up. She requested to be home. Spoke with daughter at bedside in detail.     They will consider hospice in the future.         At this point recommend supportive measures only.

## 2019-12-21 NOTE — DIETITIAN INITIAL EVALUATION ADULT. - PHYSICAL APPEARANCE
BMI 29.3 (160#, 62in). daughter endorses wt loss but unable to quantify, no EMR wt hx. large volume ascites noted which would impact wt changes. but does not appear severely wasted but unable to complete full physical assessment as pt not cooperative. will reassess at f/u if appropriate. skin intact.

## 2019-12-21 NOTE — DIETITIAN INITIAL EVALUATION ADULT. - FEEDING SKILL
Spoke with pt and pt daughter at bedside as pt is Vietnamese speaking, legally blind and very hard of hearing. Pt typically with adequate appetite and intake at baseline, but worsening over last 1 month d/t worsening abd pain and distention. C/o early satiety and only tolerating bites of food at meal times x1 week PTA. NKFA. Loosely follows DM diet at baseline. Does not take nutrition supplements/independent

## 2019-12-24 ENCOUNTER — INBOUND DOCUMENT (OUTPATIENT)
Age: 84
End: 2019-12-24

## 2019-12-24 PROBLEM — Z00.00 ENCOUNTER FOR PREVENTIVE HEALTH EXAMINATION: Status: ACTIVE | Noted: 2019-12-24

## 2020-01-05 DIAGNOSIS — H26.9 UNSPECIFIED CATARACT: ICD-10-CM

## 2020-01-05 DIAGNOSIS — M19.90 UNSPECIFIED OSTEOARTHRITIS, UNSPECIFIED SITE: ICD-10-CM

## 2020-01-05 DIAGNOSIS — D64.9 ANEMIA, UNSPECIFIED: ICD-10-CM

## 2020-01-05 DIAGNOSIS — M48.00 SPINAL STENOSIS, SITE UNSPECIFIED: ICD-10-CM

## 2020-01-05 DIAGNOSIS — H91.90 UNSPECIFIED HEARING LOSS, UNSPECIFIED EAR: ICD-10-CM

## 2020-01-05 DIAGNOSIS — I13.0 HYPERTENSIVE HEART AND CHRONIC KIDNEY DISEASE WITH HEART FAILURE AND STAGE 1 THROUGH STAGE 4 CHRONIC KIDNEY DISEASE, OR UNSPECIFIED CHRONIC KIDNEY DISEASE: ICD-10-CM

## 2020-01-05 DIAGNOSIS — K21.9 GASTRO-ESOPHAGEAL REFLUX DISEASE WITHOUT ESOPHAGITIS: ICD-10-CM

## 2020-01-05 DIAGNOSIS — I50.9 HEART FAILURE, UNSPECIFIED: ICD-10-CM

## 2020-01-05 DIAGNOSIS — H54.8 LEGAL BLINDNESS, AS DEFINED IN USA: ICD-10-CM

## 2020-01-05 DIAGNOSIS — Z51.5 ENCOUNTER FOR PALLIATIVE CARE: ICD-10-CM

## 2020-01-05 DIAGNOSIS — L60.0 INGROWING NAIL: ICD-10-CM

## 2020-01-05 DIAGNOSIS — Z53.20 PROCEDURE AND TREATMENT NOT CARRIED OUT BECAUSE OF PATIENT'S DECISION FOR UNSPECIFIED REASONS: ICD-10-CM

## 2020-01-05 DIAGNOSIS — C56.9 MALIGNANT NEOPLASM OF UNSPECIFIED OVARY: ICD-10-CM

## 2020-01-05 DIAGNOSIS — H40.9 UNSPECIFIED GLAUCOMA: ICD-10-CM

## 2020-01-05 DIAGNOSIS — E78.5 HYPERLIPIDEMIA, UNSPECIFIED: ICD-10-CM

## 2020-01-05 DIAGNOSIS — R10.9 UNSPECIFIED ABDOMINAL PAIN: ICD-10-CM

## 2020-01-05 DIAGNOSIS — I25.9 CHRONIC ISCHEMIC HEART DISEASE, UNSPECIFIED: ICD-10-CM

## 2020-01-05 DIAGNOSIS — I25.10 ATHEROSCLEROTIC HEART DISEASE OF NATIVE CORONARY ARTERY WITHOUT ANGINA PECTORIS: ICD-10-CM

## 2020-01-05 DIAGNOSIS — N17.9 ACUTE KIDNEY FAILURE, UNSPECIFIED: ICD-10-CM

## 2020-01-05 DIAGNOSIS — N18.3 CHRONIC KIDNEY DISEASE, STAGE 3 (MODERATE): ICD-10-CM

## 2020-01-05 DIAGNOSIS — E11.22 TYPE 2 DIABETES MELLITUS WITH DIABETIC CHRONIC KIDNEY DISEASE: ICD-10-CM

## 2020-01-05 DIAGNOSIS — J90 PLEURAL EFFUSION, NOT ELSEWHERE CLASSIFIED: ICD-10-CM

## 2020-01-05 DIAGNOSIS — R18.0 MALIGNANT ASCITES: ICD-10-CM

## 2020-01-05 DIAGNOSIS — C78.6 SECONDARY MALIGNANT NEOPLASM OF RETROPERITONEUM AND PERITONEUM: ICD-10-CM

## 2020-06-09 NOTE — ED PROCEDURE NOTE - CPROC ED INFORMED CONSENT1
Impression: Nonexudative age-related macular degeneration, bilateral, early dry stage: H35.3131. Plan: Macular degeneration, dry type with stable vision. Will continue to monitor vision and the patient has been instructed to call with any vision changes. AMSLER and multivitamins discussed with patient.  

rtc 6 month DE OCT/MAC ( long exam) This was an emergent procedure and consent was implied.

## 2021-07-19 NOTE — CONSULT NOTE ADULT - PROVIDER SPECIALTY LIST ADULT
Heme/Onc
Podiatry
Palliative Care
Wartpeel Counseling:  I discussed with the patient the risks of Wartpeel including but not limited to erythema, scaling, itching, weeping, crusting, and pain.

## 2023-01-01 NOTE — ED PROVIDER NOTE - ATTENDING CONTRIBUTION TO CARE
History/Exam/Medical decision making
I have personally performed a history and physical exam on this patient and personally directed the management of the patient. Patient presents for evaluation of right sided elbow pan that is moderate and throbbing in nature after she sustained a mechanical fall after slipping on a wet floor. She denies any loc and denies any vomiting.  The patient notes that she has a swollen painful right elbow with bruising noted she has decreased rom, radial pulses 2 +=, cap refill normal.  we obtained xrays which reveals olecranon fracture because of the injury we consulted ortho who reccomends outpatient management at this time splint applied I will discharge with follow up to dr abel in am.